# Patient Record
Sex: FEMALE | Race: WHITE | NOT HISPANIC OR LATINO
[De-identification: names, ages, dates, MRNs, and addresses within clinical notes are randomized per-mention and may not be internally consistent; named-entity substitution may affect disease eponyms.]

---

## 2017-07-15 ENCOUNTER — HEALTH MAINTENANCE LETTER (OUTPATIENT)
Age: 42
End: 2017-07-15

## 2018-07-22 ENCOUNTER — HEALTH MAINTENANCE LETTER (OUTPATIENT)
Age: 43
End: 2018-07-22

## 2024-04-18 ENCOUNTER — OFFICE VISIT (OUTPATIENT)
Dept: OBGYN | Facility: CLINIC | Age: 49
End: 2024-04-18
Payer: COMMERCIAL

## 2024-04-18 VITALS
DIASTOLIC BLOOD PRESSURE: 73 MMHG | WEIGHT: 146 LBS | TEMPERATURE: 97.7 F | HEART RATE: 74 BPM | SYSTOLIC BLOOD PRESSURE: 114 MMHG

## 2024-04-18 DIAGNOSIS — Z86.018 HISTORY OF UTERINE FIBROID: Primary | ICD-10-CM

## 2024-04-18 PROCEDURE — 99204 OFFICE O/P NEW MOD 45 MIN: CPT | Performed by: OBSTETRICS & GYNECOLOGY

## 2024-04-18 NOTE — NURSING NOTE
"Initial /73 (BP Location: Left arm, Patient Position: Chair, Cuff Size: Adult Regular)   Pulse 74   Temp 97.7  F (36.5  C) (Tympanic)   Wt 66.2 kg (146 lb)   LMP 03/28/2024 (Exact Date)  Estimated body mass index is 20.34 kg/m  as calculated from the following:    Height as of 1/4/11: 1.676 m (5' 6\").    Weight as of 1/4/11: 57.2 kg (126 lb). .      Carmina Cesar MA    "

## 2024-04-18 NOTE — PROGRESS NOTES
Gynecology Consult Note          HPI: Karinne A Schmitt is a 48 year old  who presents for consultation regarding pelvic discomfort and known fibroid uterus.  The patient states that over the last several years she has noted bloating and mild pelvic discomfort.  She states that it is generally manageable but she wants to confirm there is nothing concerning going on.  She states that she was told she has fibroid uterus last year after visit with PCP and finding of uterine enlargement on exam with subsequent ultrasound.  Unfortunately she requested records be sent and they are not here.  She also notes a history of significant abnormal Pap smears back in  with subsequent LEEP procedure.  She states her most recent Pap smears have been normal, she thinks her last was last year and we will request these records as well.  That she has 1 previous pregnancy, ended in termination.  Has not been on OCPs or contraception.  Is sexually active with male partner and does not desire future childbearing.  Her menses are regular, predictable, typically last 4 days.  1 day is heavy.  Generally they are manageable and nonbothersome    ROS: 10 pt ROS neg other than HPI    PMH:   Past Medical History:   Diagnosis Date    Acute gastritis without mention of hemorrhage     camphlybacter    Carcinoma in situ of cervix uteri     Other acne        PSHx:   Past Surgical History:   Procedure Laterality Date    GYN SURGERY      cone biopsy    HC ENLARGE BREAST WITH IMPLANT      HC REMOVAL OF BREAST IMPLANT  2005    SURGICAL HISTORY OF -       cone biopsy cervix       Medications:   Current Outpatient Medications   Medication Sig Dispense Refill    CENTRUM OR TABS 1 TABLET DAILY 30 0    FLAXSEED OIL 1000 MG OR CAPS 1 daily                      No current facility-administered medications for this visit.        Allergies:    No Known Allergies    Social History:   Social History     Socioeconomic History     Marital status:      Spouse name: Not on file    Number of children: 0    Years of education: Not on file    Highest education level: Not on file   Occupational History     Employer: [employer]   Tobacco Use    Smoking status: Never    Smokeless tobacco: Not on file   Substance and Sexual Activity    Alcohol use: No    Drug use: No    Sexual activity: Yes     Partners: Male     Birth control/protection: Pill   Other Topics Concern     Service No    Blood Transfusions No    Caffeine Concern Yes     Comment: 5 cans a week    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 No   Social History Narrative    Not on file     Social Determinants of Health     Financial Resource Strain: Not on file   Food Insecurity: Not on file   Transportation Needs: Not on file   Physical Activity: Not on file   Stress: Not on file   Social Connections: Not on file   Interpersonal Safety: Not on file   Housing Stability: Not on file       Family History:  Family History   Problem Relation Age of Onset    Heart Disease Maternal Grandfather     Breast Cancer Paternal Grandmother     Alzheimer Disease Paternal Grandfather        Physical Exam:   Vitals:    04/18/24 0957   BP: 114/73   BP Location: Left arm   Patient Position: Chair   Cuff Size: Adult Regular   Pulse: 74   Temp: 97.7  F (36.5  C)   TempSrc: Tympanic   Weight: 66.2 kg (146 lb)      Gen: lying in bed, NAD  CV: Reg rate, well perfused  Pulm: no increased work of breathing  Abd: non-tender, non-distended, no masses   Pelvis: normal appearing external genitalia, vaginal mucosa, cervix, bimanual exam audible for uterine enlargement with audible anterior fibroid  on the patient's right to approximately 2 cm below umbilicus  Extremities: non-tender, no erythema; no edema  Psych: normal mood and affect  Neuro: no focal deficits    Imaging: pending    A&P: Karinne A Schmitt is a 48 year old  presents for discussion of uterine fibroids.  Unfortunately her records were not faxed prior to her visit and do not have ability to review her previous ultrasound imaging.  Exam is notable for palpable, large fibroid.  Discussed recommendation for repeat imaging to see exactly how large this fibroid is.  Also requested that we reattempt to get records from previous imaging to see what, if any, interval growth has occurred since her last imaging last year.  Also would like to get her Pap records given her history of high-grade dysplasia requiring LEEP procedure.  Her most recent Pap smear here was normal in 2011.  Discussed with patient that would likely recommend endometrial biopsy at some point but I am less concerned for uterine cancer given regular menses and normal weight.  Discussed that given the size on exam it is possible she has bulk symptoms and that surgical management, particularly if not planning childbearing, would be recommended if bulk symptoms are bothersome to her.  Discussed potential for growth until she reaches menopause.  Patient states that at this point the pain is not particularly bothersome to her and she is not necessarily inclined to surgery but would defer decision-making until repeat ultrasound is completed.  Reviewed that if bleeding concerns arise outside of bulk symptoms there are certainly medical management strategies to address this.  Patient states understanding.  All questions answered, final plan of care pending repeat ultrasound results and review of outside imaging.    I spent 45 minutes reviewing chart, obtaining history, counseling, examining, coordinating care, documenting this encounter.    Gris Fritz MD   4/18/2024 10:28 AM

## 2024-04-30 ENCOUNTER — HOSPITAL ENCOUNTER (OUTPATIENT)
Dept: ULTRASOUND IMAGING | Facility: CLINIC | Age: 49
Discharge: HOME | End: 2024-04-30
Attending: OBSTETRICS & GYNECOLOGY | Admitting: OBSTETRICS & GYNECOLOGY
Payer: COMMERCIAL

## 2024-04-30 DIAGNOSIS — Z86.018 HISTORY OF UTERINE FIBROID: ICD-10-CM

## 2024-04-30 PROCEDURE — 76856 US EXAM PELVIC COMPLETE: CPT

## 2024-04-30 PROCEDURE — 76830 TRANSVAGINAL US NON-OB: CPT

## 2024-05-01 ENCOUNTER — TELEPHONE (OUTPATIENT)
Dept: OBGYN | Facility: CLINIC | Age: 49
End: 2024-05-01
Payer: COMMERCIAL

## 2024-05-01 NOTE — TELEPHONE ENCOUNTER
[facility] Call Center    Phone Message    May a detailed message be left on voicemail: yes     Reason for Call: Other: Pt is wondering about her medical records from [facility]. Pt states the provider was going to request them, as she was unsuccessful. She is wondering if those ever came in. Pt would like to compare her recent lab results to those from that clinic. Please review and call pt to discuss.     Action Taken: Other: OBGYN    Travel Screening: Not Applicable

## 2024-05-02 NOTE — TELEPHONE ENCOUNTER
I just noticed them on my desk this morning, so they did make it. I have not had a chance to review them yet though    Gris Fritz MD       Patient notified that records have been received. Dr. Fritz will review and let patient know if there has been changes from last year and what the final plan of care recommendations are.    Belinda ANGUIANO   Ob/Gyn Clinic

## 2024-06-13 ENCOUNTER — OFFICE VISIT (OUTPATIENT)
Dept: OBGYN | Facility: CLINIC | Age: 49
End: 2024-06-13
Payer: COMMERCIAL

## 2024-06-13 VITALS
TEMPERATURE: 97.4 F | SYSTOLIC BLOOD PRESSURE: 101 MMHG | WEIGHT: 146 LBS | DIASTOLIC BLOOD PRESSURE: 67 MMHG | HEART RATE: 67 BPM

## 2024-06-13 DIAGNOSIS — N93.9 ABNORMAL UTERINE BLEEDING (AUB): Primary | ICD-10-CM

## 2024-06-13 PROCEDURE — 99214 OFFICE O/P EST MOD 30 MIN: CPT | Performed by: OBSTETRICS & GYNECOLOGY

## 2024-06-13 NOTE — PROGRESS NOTES
Gynecology Consult Note        HPI: Karinne A Schmitt is a 48 year old  known fibroid uterus who returns to review plan of care regarding fibroids.  She denies any issues with worsening bleeding.  Menses are generally predictable.  No severe heavy bleeding.  Feels that it is manageable.  The previous discomfort that she was having has improved over the last couple of months.  Did have repeat ultrasound completed and would like to review these results further.    ROS: 10 pt ROS neg other than HPI    PMH:   Past Medical History:   Diagnosis Date    Acute gastritis without mention of hemorrhage     camphlybacter    Carcinoma in situ of cervix uteri     Other acne        PSHx:   Past Surgical History:   Procedure Laterality Date    GYN SURGERY      cone biopsy    HC ENLARGE BREAST WITH IMPLANT      HC REMOVAL OF BREAST IMPLANT  2005    SURGICAL HISTORY OF -       cone biopsy cervix       Medications:   Current Outpatient Medications   Medication Sig Dispense Refill    CENTRUM OR TABS 1 TABLET DAILY 30 0    desogestrel-ethinyl estradiol (MIRCETTE) 0.15-0.02/0.01 MG (/) per tablet Take 1 tablet by mouth daily.    3 Package 3    drospirenone-ethinyl estradiol (OCELLA) 3-0.03 MG per tablet Take 1 tablet by mouth daily. 90 tablet 1    FLAXSEED OIL 1000 MG OR CAPS 1 daily       No current facility-administered medications for this visit.        Allergies:    No Known Allergies    Social History:   Social History     Socioeconomic History    Marital status:      Spouse name: Not on file    Number of children: 0    Years of education: Not on file    Highest education level: Not on file   Occupational History     Employer: [employer]   Tobacco Use    Smoking status: Never    Smokeless tobacco: Not on file   Substance and Sexual Activity    Alcohol use: No    Drug use: No    Sexual activity: Yes     Partners: Male     Birth control/protection: Pill   Other Topics Concern     Service No     Blood Transfusions No    Caffeine Concern Yes     Comment: 5 cans a week    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 No   Social History Narrative    Not on file     Social Determinants of Health     Financial Resource Strain: Not At Risk (1/4/2024)    Received from [facility]    Financial Resource Strain     Is it hard for you to pay for the very basics like food, housing, medical care or heating?: No   Food Insecurity: Not At Risk (1/4/2024)    Received from [facility]    Food Insecurity     Does your food run out before you have the money to buy more?: No   Transportation Needs: Not At Risk (1/4/2024)    Received from [facility]    Transportation Needs     Does a lack of transportation keep you from your medical appointments or from getting your medications?: No   Physical Activity: Not on file   Stress: Not on file   Social Connections: Not on file   Interpersonal Safety: Not on file   Housing Stability: Not on file       Family History:  Family History   Problem Relation Age of Onset    Heart Disease Maternal Grandfather     Breast Cancer Paternal Grandmother     Alzheimer Disease Paternal Grandfather        Physical Exam:   Vitals:    06/13/24 1135   BP: 101/67   BP Location: Left arm   Patient Position: Chair   Cuff Size: Adult Regular   Pulse: 67   Temp: 97.4  F (36.3  C)   TempSrc: Tympanic   Weight: 66.2 kg (146 lb)      Gen: lying in bed, NAD  CV: Reg rate, well perfused  Pulm: no increased work of breathing  Abd: non-tender, non-distended, no masses   Pelvis: deferred  Extremities: non-tender, no erythema; no edema  Psych: normal mood and affect  Neuro: no focal  deficits    Imaging:  Pelvic US  UTERUS: 10.2 x 4.8 x 7.4 cm. There is a hypoechoic fibroid in the  upper fundus measuring 7.8 x 6.8 x 5.8 cm.     ENDOMETRIUM: 5 mm. Normal smooth endometrium.     RIGHT OVARY: 3.1 x 1.9 x 1.7 cm. Normal size and appearance.     LEFT OVARY: 3.7 x 2.2 x 1.4 cm. Normal size and appearance.     No significant free fluid.                                                                      IMPRESSION:  1.  Large fibroid in the upper fundus measuring up to 7.8 cm.     A&P: Karinne A Schmitt is a 48 year old who presents for follow-up of fibroid uterus.  The patient had repeat imaging obtained which was notable for a 7.8 x 6.8 x 5.8 uterine fibroid.  Did [place] which were notable for mesh mention of multiple uterine fibroids with the largest measuring 6.1 x 5.4 x 5.2 cm.  Discussed with patient whether significant growth has occurred is somewhat unclear as the an ultrasound here only mentions a single fibroid and that it is possible  that the most recent ultrasound actually measured a complex of fibroids that the initial ultrasound only measured a portion of.  Regardless, if growth has occurred it has been fairly small of up to 1 to 2 cm over the course of a year.  Her symptoms are currently improved.  She feels her bleeding is manageable and pain has improved.  Therefore discussed options with the patient including expectant management, bleeding management with IUD or other hormonal method versus surgical management with embolization or hysterectomy.  The patient would like to continue with expectant management for now.  She knows to return should she have worsening bleeding.  Did offer the patient an endometrial biopsy today but she declined stating that her bleeding has been fine and she does not have concerns for this.  Did review the option of repeating an ultrasound in a year to see what if any growth of the fibroid has occurred in that  interval, sooner if new or worsening symptoms.  I spent 30 minutes reviewing chart, obtaining history, counseling, examining, coordinating care, documenting this encounter.      Gris Fritz MD   6/13/2024 3:00 PM

## 2024-06-13 NOTE — NURSING NOTE
"Initial /67 (BP Location: Left arm, Patient Position: Chair, Cuff Size: Adult Regular)   Pulse 67   Temp 97.4  F (36.3  C) (Tympanic)   Wt 66.2 kg (146 lb)   LMP 06/10/2024 (Exact Date)  Estimated body mass index is 20.34 kg/m  as calculated from the following:    Height as of 1/4/11: 1.676 m (5' 6\").    Weight as of 1/4/11: 57.2 kg (126 lb). .    Carmina Cesar MA    "

## 2024-06-16 ENCOUNTER — HEALTH MAINTENANCE LETTER (OUTPATIENT)
Age: 49
End: 2024-06-16

## 2024-07-02 ENCOUNTER — TELEPHONE (OUTPATIENT)
Dept: OBGYN | Facility: CLINIC | Age: 49
End: 2024-07-02
Payer: COMMERCIAL

## 2024-07-02 NOTE — TELEPHONE ENCOUNTER
Unable to reach patient via phone. Left message to call back at [phone].    Larissa Narayanan RN

## 2024-07-02 NOTE — TELEPHONE ENCOUNTER
[facility] Call Center    Phone Message    May a detailed message be left on voicemail: yes     Reason for Call: Other: . Pt is calling in requesting a diagnostic mammogram or an Ultrasound-  pt stated she has been having numbness in her left arm, and is wondering if it's coming from her breast, or what types of imaging/testing would be needed, please reach out to Karinne, thank you    Action Taken: Message routed to:  Other: obgyn    Travel Screening: Not Applicable     Date of Service:

## 2024-07-02 NOTE — TELEPHONE ENCOUNTER
"Pt is experiencing left arm numbness for the past few days.  She states it seems to start in her armpit goes into her shoulder and down arm.  Pt does have a history of concern for \"irregular lymph nodes in that area\".  She states she has been checked out by family practice providers for this in the past.    Denies doing anything that could have strained that area.    Pt does have a family history of a missed diagnosed breast cancer which could have been caught doing an US in the armpit area.  Pt is very nervous about that and would like to have it checked out.    Denies chest pain or difficulty breathing.  Denies any other concerns with her breasts.    Routing to Dr. Fritz to place requested orders if appropriate.    Larissa Narayanan RN        "

## 2024-09-09 ENCOUNTER — PATIENT OUTREACH (OUTPATIENT)
Dept: OBGYN | Facility: CLINIC | Age: 49
End: 2024-09-09
Payer: COMMERCIAL

## 2024-09-09 NOTE — TELEPHONE ENCOUNTER
Panel Management Review        Health Maintenance List    Health Maintenance   Topic Date Due    ADVANCE CARE PLANNING  Never done    COLORECTAL CANCER SCREENING  Never done    HIV SCREENING  Never done    HEPATITIS C SCREENING  Never done    HEPATITIS B IMMUNIZATION (1 of 3 - 19+ 3-dose series) Never done    YEARLY PREVENTIVE VISIT  01/04/2012    GLUCOSE  01/04/2014    LIPID  Never done    DTAP/TDAP/TD IMMUNIZATION (4 - Td or Tdap) 02/21/2023    PAP  09/21/2023    INFLUENZA VACCINE (1) 09/01/2024    COVID-19 Vaccine (1 - 2023-24 season) Never done    ZOSTER IMMUNIZATION (1 of 2) 11/13/2024    MAMMO SCREENING  03/14/2025    PHQ-2 (once per calendar year)  Completed    Pneumococcal Vaccine: Pediatrics (0 to 5 Years) and At-Risk Patients (6 to 64 Years)  Aged Out    HPV IMMUNIZATION  Aged Out    MENINGITIS IMMUNIZATION  Aged Out    RSV MONOCLONAL ANTIBODY  Aged Out       Composite cancer screening  Chart review shows that this patient is due/due soon for the following Colonoscopy  Lab Results   Component Value Date    PAP NIL 01/04/2011     Past Surgical History:   Procedure Laterality Date    GYN SURGERY  2006    cone biopsy    HC ENLARGE BREAST WITH IMPLANT  1999    HC REMOVAL OF BREAST IMPLANT  5/2005    SURGICAL HISTORY OF -   1/06    cone biopsy cervix       Is hysterectomy listed in surgical history? No   Is mastectomy listed in surgical history? No     Summary:    Patient is due/failing the following:   Colonoscopy    Action needed: Patient needs office visit for colonoscopy.    Type of outreach:  Sent [employer] message.      Staff Signature:  Carmina Cesar MA

## 2024-09-09 NOTE — LETTER
2024      Karinne A Schmitt  [address] [place] [address] [address]  [facility] [address]        Dear Karinne,       To ensure we are providing the best quality care, we have reviewed your chart and see that you are due for:    Colon Cancer Screening:    If you have received a referral to schedule a Colonoscopy or choose to do a Colonoscopy instead of the FIT/ Cologuard test, please call [phone] to schedule.    If you have received a FIT test kit from a prior office visit, please check the expiration date. If , please get a new kit from the Lab/ Clinic. If not , please complete the test and mail in as soon as you are able.    If you would prefer to complete a Cologuard test, please reach out to your provider to see if this is an option for you.    You may call Dept: [phone] if you have any questions. If you have completed the tests outside of [facility], please have the results forwarded to our office Fax # [phone]. We will update the chart for your primary Physician to review before your next annual physical.          Sincerely,        Gris Fritz MD

## 2025-04-21 ENCOUNTER — PATIENT OUTREACH (OUTPATIENT)
Dept: OBGYN | Facility: CLINIC | Age: 50
End: 2025-04-21
Payer: COMMERCIAL

## 2025-04-21 NOTE — LETTER
2025      Karinne A Schmitt  [address] [place] [address] [address]  [facility] [address]        Dear Karinne,       To ensure we are providing the best quality care, we have reviewed your chart and see that you are due for:    Colon Cancer Screening:    If you have received a referral to schedule a Colonoscopy or choose to do a Colonoscopy instead of the FIT/ Cologuard test, please call [phone] to schedule.    If you have received a FIT test kit from a prior office visit, please check the expiration date. If , please get a new kit from the Lab/ Clinic. If not , please complete the test and mail in as soon as you are able.    If you would prefer to complete a Cologuard test, please reach out to your provider to see if this is an option for you.    You may call Dept: [phone] if you have any questions. If you have completed the tests outside of [facility], please have the results forwarded to our office Fax # [phone]. We will update the chart for your primary Physician to review before your next annual physical.          Sincerely,        Gris Fritz MD    Electronically signed

## 2025-04-21 NOTE — TELEPHONE ENCOUNTER
Panel Management Review        Health Maintenance List    Health Maintenance   Topic Date Due    ADVANCE CARE PLANNING  Never done    COLORECTAL CANCER SCREENING  Never done    HEPATITIS B IMMUNIZATION (1 of 3 - 19+ 3-dose series) Never done    YEARLY PREVENTIVE VISIT  01/04/2012    DIABETES SCREENING  01/04/2014    LIPID  Never done    DTAP/TDAP/TD IMMUNIZATION (4 - Td or Tdap) 02/21/2023    PAP  09/21/2023    INFLUENZA VACCINE (1) 09/01/2024    COVID-19 Vaccine (1 - 2024-25 season) Never done    PHQ-2 (once per calendar year)  01/01/2025    Pneumococcal Vaccine: 50+ Years (1 of 1 - PCV) 11/13/2024    ZOSTER IMMUNIZATION (1 of 2) 11/13/2024    MAMMO SCREENING  03/14/2025    HEPATITIS C SCREENING  Completed    HIV SCREENING  Completed    HPV IMMUNIZATION  Aged Out    MENINGITIS IMMUNIZATION  Aged Out       Composite cancer screening  Chart review shows that this patient is due/due soon for the following Colonoscopy  Lab Results   Component Value Date    PAP NIL 01/04/2011     Past Surgical History:   Procedure Laterality Date    GYN SURGERY  2006    cone biopsy    HC ENLARGE BREAST WITH IMPLANT  1999    HC REMOVAL OF BREAST IMPLANT  5/2005    SURGICAL HISTORY OF -   1/06    cone biopsy cervix       Is hysterectomy listed in surgical history? No   Is mastectomy listed in surgical history? No     Summary:    Patient is due/failing the following:   Colonoscopy    Action needed: Patient needs referral/order: colon cancer screen    Type of outreach:  Sent [employer] message.      Staff Signature:  Carmina Cesar MA

## 2025-05-31 ENCOUNTER — HEALTH MAINTENANCE LETTER (OUTPATIENT)
Age: 50
End: 2025-05-31

## 2025-06-21 ENCOUNTER — HEALTH MAINTENANCE LETTER (OUTPATIENT)
Age: 50
End: 2025-06-21